# Patient Record
Sex: FEMALE | Race: WHITE | NOT HISPANIC OR LATINO | Employment: FULL TIME | URBAN - METROPOLITAN AREA
[De-identification: names, ages, dates, MRNs, and addresses within clinical notes are randomized per-mention and may not be internally consistent; named-entity substitution may affect disease eponyms.]

---

## 2024-06-18 ENCOUNTER — APPOINTMENT (OUTPATIENT)
Dept: RADIOLOGY | Facility: HOSPITAL | Age: 19
End: 2024-06-18
Attending: STUDENT IN AN ORGANIZED HEALTH CARE EDUCATION/TRAINING PROGRAM
Payer: COMMERCIAL

## 2024-06-18 ENCOUNTER — HOSPITAL ENCOUNTER (EMERGENCY)
Facility: HOSPITAL | Age: 19
Discharge: HOME | End: 2024-06-18
Attending: STUDENT IN AN ORGANIZED HEALTH CARE EDUCATION/TRAINING PROGRAM | Admitting: STUDENT IN AN ORGANIZED HEALTH CARE EDUCATION/TRAINING PROGRAM
Payer: COMMERCIAL

## 2024-06-18 VITALS
TEMPERATURE: 99.1 F | RESPIRATION RATE: 22 BRPM | HEART RATE: 80 BPM | OXYGEN SATURATION: 96 % | WEIGHT: 168 LBS | HEIGHT: 67 IN | SYSTOLIC BLOOD PRESSURE: 134 MMHG | BODY MASS INDEX: 26.37 KG/M2 | DIASTOLIC BLOOD PRESSURE: 63 MMHG

## 2024-06-18 DIAGNOSIS — J02.9 PHARYNGITIS, UNSPECIFIED ETIOLOGY: ICD-10-CM

## 2024-06-18 LAB
FLUAV RNA SPEC QL NAA+PROBE: NEGATIVE
FLUBV RNA RESP QL NAA+PROBE: NEGATIVE
GROUP A STREP BY PCR: NOT DETECTED
RSV RNA SPEC NAA+PROBE: NEGATIVE
SARS-COV-2 RNA RESP QL NAA+PROBE: NEGATIVE

## 2024-06-18 PROCEDURE — 87637 SARSCOV2&INF A&B&RSV AMP PRB: CPT | Performed by: STUDENT IN AN ORGANIZED HEALTH CARE EDUCATION/TRAINING PROGRAM

## 2024-06-18 PROCEDURE — 99284 EMERGENCY DEPT VISIT MOD MDM: CPT | Mod: 25

## 2024-06-18 PROCEDURE — 250N000012 HC RX MED GY IP 250 OP 636 PS 637: Performed by: STUDENT IN AN ORGANIZED HEALTH CARE EDUCATION/TRAINING PROGRAM

## 2024-06-18 PROCEDURE — 71046 X-RAY EXAM CHEST 2 VIEWS: CPT

## 2024-06-18 PROCEDURE — 250N000013 HC RX MED GY IP 250 OP 250 PS 637: Performed by: STUDENT IN AN ORGANIZED HEALTH CARE EDUCATION/TRAINING PROGRAM

## 2024-06-18 PROCEDURE — 87651 STREP A DNA AMP PROBE: CPT | Performed by: STUDENT IN AN ORGANIZED HEALTH CARE EDUCATION/TRAINING PROGRAM

## 2024-06-18 RX ORDER — ACETAMINOPHEN 325 MG/1
975 TABLET ORAL ONCE
Status: COMPLETED | OUTPATIENT
Start: 2024-06-18 | End: 2024-06-18

## 2024-06-18 RX ORDER — IBUPROFEN 600 MG/1
600 TABLET, FILM COATED ORAL ONCE
Status: COMPLETED | OUTPATIENT
Start: 2024-06-18 | End: 2024-06-18

## 2024-06-18 RX ADMIN — DEXAMETHASONE INTENSOL 10 MG: 1 SOLUTION, CONCENTRATE ORAL at 04:12

## 2024-06-18 RX ADMIN — IBUPROFEN 600 MG: 600 TABLET, FILM COATED ORAL at 04:11

## 2024-06-18 RX ADMIN — ACETAMINOPHEN 975 MG: 325 TABLET ORAL at 04:10

## 2024-06-18 ASSESSMENT — COLUMBIA-SUICIDE SEVERITY RATING SCALE - C-SSRS
6. HAVE YOU EVER DONE ANYTHING, STARTED TO DO ANYTHING, OR PREPARED TO DO ANYTHING TO END YOUR LIFE?: NO
2. HAVE YOU ACTUALLY HAD ANY THOUGHTS OF KILLING YOURSELF IN THE PAST MONTH?: NO
1. IN THE PAST MONTH, HAVE YOU WISHED YOU WERE DEAD OR WISHED YOU COULD GO TO SLEEP AND NOT WAKE UP?: NO

## 2024-06-18 ASSESSMENT — ACTIVITIES OF DAILY LIVING (ADL)
ADLS_ACUITY_SCORE: 35

## 2024-06-18 NOTE — ED NOTES
Patient was signed out to me by Dr. Andres.  Patient was awaiting a rapid strep test.      Rapid strep test was negative.    Patient was reevaluated and informed of the negative rapid strep test, COVID, and influenza results.  She was also informed of the nondiagnostic chest x-ray results.     The patient was informed that her symptoms likely represent a viral infection.  She was informed that she would be contacted for any positive strep culture results within the next 1 to 2 days.  The patient was also informed that her symptoms will likely improve once the Decadron starts to take effect.  The patient was informed that her symptoms could represent mono and that she should be tested for mono if her symptoms persist for the next week.  The patient was instructed to use over-the-counter Tylenol and ibuprofen as needed and to rest and drink plenty of fluids.  She was instructed to follow-up with her primary care provider for reevaluation or to return back to ED sooner for any worsening pain, throat swelling, or any other new or concerning symptoms.     Meg Holt DO  06/18/24 0603

## 2024-06-18 NOTE — ED TRIAGE NOTES
"Pt reports throat \"tightness\" for couple of hours. Started when she went to bed. No hx of resp disease. States it feels like she's being \"choked out\".      Triage Assessment (Adult)       Row Name 06/18/24 0341          Triage Assessment    Airway WDL WDL        Respiratory WDL    Respiratory WDL X;rhythm/pattern     Rhythm/Pattern, Respiratory shortness of breath        Cardiac WDL    Cardiac WDL WDL                     "

## 2024-06-18 NOTE — ED PROVIDER NOTES
"EMERGENCY DEPARTMENT ENCOUNTER      NAME: HARRIS Ortega  AGE: 18 year old female  YOB: 2005  MRN: [id number]  EVALUATION DATE & TIME: 6/18/2024  3:42 AM    PCP: No primary care provider on file.    ED PROVIDER: Kyler Andres MD      Chief Complaint   Patient presents with    Shortness of Breath         FINAL IMPRESSION:  1. Pharyngitis, unspecified etiology          ED COURSE & MEDICAL DECISION MAKING:    Pertinent Labs & Imaging studies reviewed. (See chart for details)  18 year old female presents to the Emergency Department for evaluation of sore throat    ED Course as of 06/18/24 0542   Tue Jun 18, 2024   0351 Patient is a 18-year-old female who is previously healthy and presents to the emergency department with several hours of sore throat, in addition to 1 week of \"cold symptoms\", including productive cough.  No fevers, stridor, wheezing.  On exam her oropharynx is symmetric with a midline uvula, she does have mild tonsillar swelling, erythema, exudates.  No submandibular edema.  Trachea midline.  Differential diagnosis includes strep throat, viral pharyngitis, pneumonia.  Is afraid of needles, and declined mono testing, and declined Toradol.  Instead will treat with Tylenol, oral Decadron 10 mg, ibuprofen.   0443 Chest x-ray does not show any acute cardiopulmonary process, specifically no pneumonia.   0449 Viral swabs negative     5:42 AM patient signed out with the following to do:  -Follow-up strep swab    Medical Decision Making  Obtained supplemental history:Supplemental history obtained?: No  Reviewed external records: External records reviewed?: No  Care impacted by chronic illness:N/A  Care significantly affected by social determinants of health:Access to Medical Care  Did you consider but not order tests?: Work up considered but not performed and documented in chart, if applicable  Did you interpret images independently?: Independent interpretation of ECG and images noted in " documentation, when applicable.  Consultation discussion with other provider:Did you involve another provider (consultant, , pharmacy, etc.)?: No  Discharge. No recommendations on prescription strength medication(s). See documentation for any additional details.        At the conclusion of the encounter I discussed the results of all of the tests and the disposition. The questions were answered. The patient or family acknowledged understanding and was agreeable with the care plan.     0 minutes of critical care time     MEDICATIONS GIVEN IN THE EMERGENCY:  Medications   acetaminophen (TYLENOL) tablet 975 mg (975 mg Oral $Given 6/18/24 0410)   ibuprofen (ADVIL/MOTRIN) tablet 600 mg (600 mg Oral $Given 6/18/24 0411)   dexAMETHasone (DECADRON) (HIGH CONC) solution 10 mg (10 mg Oral $Given 6/18/24 0412)       NEW PRESCRIPTIONS STARTED AT TODAY'S ER VISIT  New Prescriptions    No medications on file          =================================================================    HPI    Patient information was obtained from: The patient     Use of : N/A         HARRIS Ortega is a 18 year old female with no pertinent history who presents to this ED by private vehicle for evaluation of difficulty breathing.     For the last few hours, the patient has had a tightness or pain around her neck and a hard time breathing and talking. She tried putting peppermint oil on her neck, eating honey and cough drops, and applying heat with no relief. She denies any recent fever. She has no known sick contacts. She has had a slight cold for the past week with a productive cough. She has no history of respiratory disease.       PAST MEDICAL HISTORY:  No past medical history on file.    PAST SURGICAL HISTORY:  No past surgical history on file.        CURRENT MEDICATIONS:    No current outpatient medications on file.      ALLERGIES:  No Known Allergies    FAMILY HISTORY:  No family history on file.    SOCIAL HISTORY:   Social  "History     Socioeconomic History    Marital status: Single       VITALS:  /86   Pulse 94   Temp 99.1  F (37.3  C) (Oral)   Resp 22   Ht 1.702 m (5' 7\")   Wt 76.2 kg (168 lb)   LMP 05/28/2024   SpO2 98%   BMI 26.31 kg/m      PHYSICAL EXAM    Physical Exam  Vitals and nursing note reviewed.   Constitutional:       General: She is not in acute distress.     Appearance: Normal appearance. She is normal weight. She is not ill-appearing.   HENT:      Head: Normocephalic and atraumatic.      Nose: Nose normal.      Mouth/Throat:      Mouth: Mucous membranes are moist.      Pharynx: Oropharyngeal exudate and posterior oropharyngeal erythema present.      Comments: Uvula midline, mild bilateral tonsillar enlargement with erythema.  No crowding of the oropharynx.  No submandibular edema.  No tongue or lip swelling.  Eyes:      Extraocular Movements: Extraocular movements intact.      Conjunctiva/sclera: Conjunctivae normal.      Pupils: Pupils are equal, round, and reactive to light.   Cardiovascular:      Rate and Rhythm: Normal rate and regular rhythm.      Pulses: Normal pulses.      Heart sounds: Normal heart sounds. No murmur heard.  Pulmonary:      Effort: Pulmonary effort is normal. No respiratory distress.      Breath sounds: Normal breath sounds.   Abdominal:      General: Abdomen is flat. There is no distension.      Palpations: Abdomen is soft.      Tenderness: There is no abdominal tenderness.   Musculoskeletal:         General: Normal range of motion.      Cervical back: Normal range of motion.      Right lower leg: No edema.      Left lower leg: No edema.   Skin:     General: Skin is warm and dry.      Capillary Refill: Capillary refill takes less than 2 seconds.   Neurological:      General: No focal deficit present.      Mental Status: She is alert and oriented to person, place, and time. Mental status is at baseline.   Psychiatric:         Mood and Affect: Mood normal.         Behavior: " Behavior normal.         Thought Content: Thought content normal.         Judgment: Judgment normal.            LAB:  All pertinent labs reviewed and interpreted.  Results for orders placed or performed during the hospital encounter of 06/18/24   XR Chest 2 Views    Impression    IMPRESSION: Negative chest.   Symptomatic Influenza A/B, RSV, & SARS-CoV2 PCR (COVID-19) Nasopharyngeal    Specimen: Nasopharyngeal; Swab   Result Value Ref Range    Influenza A PCR Negative Negative    Influenza B PCR Negative Negative    RSV PCR Negative Negative    SARS CoV2 PCR Negative Negative       RADIOLOGY:  Reviewed all pertinent imaging. Please see official radiology report.  XR Chest 2 Views   Final Result   IMPRESSION: Negative chest.          PROCEDURES:   None      [facility] Documentation:   CMS Diagnoses:               I, Melissa Houser, am serving as a scribe to document services personally performed by Kyler Andres MD based on my observation and the provider's statements to me. I, Kyler Andres MD, attest that Melissa Houser is acting in a scribe capacity, has observed my performance of the services and has documented them in accordance with my direction.    Kyler Andres MD  [facility] EMERGENCY DEPARTMENT  [address] [place] [id number]  [phone]       Kyler Andres MD  06/18/24 7143

## 2024-06-18 NOTE — DISCHARGE INSTRUCTIONS
Continue to treat your symptoms using 1000 mg Tylenol every 6 hours, and 600 mg ibuprofen every 6 hours.  These may be taken together, or alternated every 3 hours.    Please return to the emergency department if your symptoms worsen.

## 2024-06-18 NOTE — Clinical Note
HARRIS Ortega was seen and treated in our emergency department on 6/18/2024.  She may return to work on 06/19/2024.       If you have any questions or concerns, please don't hesitate to call.      Kyler Andres MD

## 2024-06-18 NOTE — Clinical Note
Jordan was seen and treated in our emergency department on 6/18/2024.  She may return to school on 06/19/2024.      If you have any questions or concerns, please don't hesitate to call.      Kyler Andres MD